# Patient Record
Sex: MALE | Race: BLACK OR AFRICAN AMERICAN | Employment: OTHER | URBAN - METROPOLITAN AREA
[De-identification: names, ages, dates, MRNs, and addresses within clinical notes are randomized per-mention and may not be internally consistent; named-entity substitution may affect disease eponyms.]

---

## 2019-08-23 ENCOUNTER — DOCUMENTATION ONLY (OUTPATIENT)
Dept: CARDIOLOGY CLINIC | Age: 64
End: 2019-08-23

## 2020-07-02 ENCOUNTER — OFFICE VISIT (OUTPATIENT)
Dept: CARDIOLOGY CLINIC | Age: 65
End: 2020-07-02

## 2020-07-02 VITALS
HEIGHT: 72 IN | TEMPERATURE: 98 F | HEART RATE: 71 BPM | SYSTOLIC BLOOD PRESSURE: 177 MMHG | RESPIRATION RATE: 18 BRPM | OXYGEN SATURATION: 99 % | BODY MASS INDEX: 27.71 KG/M2 | DIASTOLIC BLOOD PRESSURE: 84 MMHG | WEIGHT: 204.6 LBS

## 2020-07-02 DIAGNOSIS — R94.39 ABNORMAL NUCLEAR STRESS TEST: ICD-10-CM

## 2020-07-02 DIAGNOSIS — Z76.89 ESTABLISHING CARE WITH NEW DOCTOR, ENCOUNTER FOR: ICD-10-CM

## 2020-07-02 DIAGNOSIS — Z01.818 PRE-OP TESTING: Primary | ICD-10-CM

## 2020-07-02 RX ORDER — LISINOPRIL 40 MG/1
40 TABLET ORAL DAILY
COMMUNITY

## 2020-07-02 NOTE — PROGRESS NOTES
[facility]    Mr. Josafat Ang is a 81-year-old male with diabetes, hypertension    Patient is here today for for initial evaluation. He denies any prior history of myocardial infarction or congestive heart failure. He was referred to me for cardiac catheterization because of abnormal stress test before eye surgery. According to patient he supposed to have a cataract surgery. However patient had a nuclear stress test in 2018 which was abnormal.  Patient was told that he needs cardiac catheterization before undergoing cataract surgery  Patient is very frustrated that for cataract surgery he needs cardiac catheterization despite he is not having any symptoms. He denies any resting or exertional chest pain or shortness of breath. He said that he can walk up to 5 or 10 miles without any symptoms. He performs cardio exercise in the form of martial art without any symptoms. He can walk at a faster pace without any symptoms. He can climb 3 or 4 flight without any symptoms. He denies any presyncope or syncope. He denies PND or any edema. He denies any specific cardiac complaint at this time  Denies any nausea, vomiting, abdominal pain, fever, chills, sputum production. No hematuria or other bleeding complaints    Past Medical History:   Diagnosis Date    Diabetes ([facility] [address]     HTN (hypertension)     PAD (peripheral artery disease) (HCC)          No past surgical history on file. Current Outpatient Medications   Medication Sig    insulin detemir U-100 (LEVEMIR) 100 unit/mL injection by SubCUTAneous route nightly.  lisinopriL (PRINIVIL, ZESTRIL) 40 mg tablet Take 40 mg by mouth daily. No current facility-administered medications for this visit. Allergies and Sensitivities:  Allergies not on file    Family History:  No family history on file.     Social History:  Social History     Tobacco Use    Smoking status: Not on file Substance Use Topics    Alcohol use: Not on file    Drug use: Not on file     She  has no history on file for tobacco.  She  has no history on file for alcohol. Review of Systems:  Cardiac symptoms as noted above in HPI. All others negative. Denies fatigue, malaise, skin rash, joint pain, blurring vision, photophobia, neck pain, hemoptysis, chronic cough, nausea, vomiting, hematuria, burning micturition, BRBPR, chronic headaches. Physical Exam:  BP Readings from Last 3 Encounters:   07/02/20 177/84         Pulse Readings from Last 3 Encounters:   07/02/20 71          Wt Readings from Last 3 Encounters:   07/02/20 204 lb 9.6 oz (92.8 kg)       Constitutional: Oriented to person, place, and time. HENT: Head: Normocephalic and atraumatic. Eyes: Conjunctivae and extraocular motions are normal.   Neck: No JVD present. Carotid bruit is not appreciated. Cardiovascular: Regular rhythm. No murmur, gallop or rubs appreciated  Lung: Breath sounds normal. No respiratory distress. No ronchi or rales appreciated  Abdominal: No tenderness. No rebound and no guarding. Musculoskeletal: There is no lower extremity edema. No cynosis  Lymphadenopathy:  No cervical or supraclavicular adenopathy appriciated. Neurological: No gross motor deficit noted. Skin: No visible skin rash noted. No Ear discharge noted  Psychiatric: Normal mood and affect. Review of Data  LABS:   No results found for: NA, K, CL, CO2, GLU, BUN, CREA  No flowsheet data found. No results found for: ALT  No results found for: HBA1C, HGBE8, WPS1WXGB, WNQ1STOJ    EKG    ECHO    STRESS TEST (2018)  Inferior ischemia according to progress note from [place] [facility]. No detail available at this time    CATHETERIZATION    IMPRESSION & PLAN:  Mr. Sarah Beth Ceballos is 28-year-old male with diabetes and hypertension    Hypertension:  Initially his blood pressure was 177/84 mmHg however patient is very anxious and frustrated.   He tells me that usually his blood pressure is better. Repeat blood pressure was 140/82. Currently on lisinopril and I recommend to continue same  We will order echo to rule out hypertensive cardiovascular heart disease    Diabetes:  Currently patient appears to be on insulin. Goal hemoglobin A1c less than 7 is recommended from cardiovascular standpoint. Defer to PCP    Preoperative evaluation:  According to patient, he has tolerated 25 foot surgeries in the past.  Patient is supposed to undergo cataract surgery however he was told to have cardiac catheterization done prior to cataract surgery. He is frustrated and angry that he has to undergo cardiac catheterization for simple procedure like cataract especially as he has no exertional symptoms. According to patient and according to the record available, patient had complained of chest pain to [address] couple years ago and had a stress test and 2018 which showed possible ischemia inferiorly. Since then patient has no more chest pain and he has no shortness of breath. He is able to perform activity of daily living without any symptoms. His functional status appears to be more than 4 METS. I offered him cardiac catheterization as requested by [facility] however he is refusing any cardiac catheterization in absence of any symptoms which is reasonable  Cataract surgery usually is low risk surgery from cardiovascular standpoint. Patient is okay to consider echo and stress test prior to surgery. I would offer echo and stress test and if they are unremarkable, in my opinion it would be low risk to proceed cataract surgery. He is okay to get this testing done    This plan was discussed with patient who is in agreement. Thank you for allowing me to participate in patient care. Please feel free to call me if you have any question or concern. Belinda Chadwick MD  Please note: This document has been produced using voice recognition software.  Unrecognized errors in transcription may be present.

## 2020-07-02 NOTE — PROGRESS NOTES
Jonnie Goldberg presents today for   Chief Complaint   Patient presents with   [address] [place] [address] New Patient       Jonnie Goldberg preferred language for health care discussion is english/other. Is someone accompanying this pt? n    Is the patient using any DME equipment during OV? n    Depression Screening:  No flowsheet data found. Learning Assessment:  No flowsheet data found. Abuse Screening:  No flowsheet data found. Fall Risk  No flowsheet data found. Pt currently taking Anticoagulant therapy? n    Coordination of Care:  1. Have you been to the ER, urgent care clinic since your last visit? Hospitalized since your last visit? n    2. Have you seen or consulted any other health care providers outside of the [address] [place] [address] since your last visit? Include any pap smears or colon screening.  n

## 2020-07-02 NOTE — PATIENT INSTRUCTIONS
Testing   Echo/ Nuclear Stress  Please call [facility] central scheduling at [phone]  to schedule an appointment.    All testing is completed at [address] [facility]  **call office three days after testing for results**

## 2020-07-16 ENCOUNTER — HOSPITAL ENCOUNTER (OUTPATIENT)
Dept: NON INVASIVE DIAGNOSTICS | Age: 65
Discharge: HOME | End: 2020-07-16
Attending: INTERNAL MEDICINE
Payer: OTHER GOVERNMENT

## 2020-07-16 ENCOUNTER — HOSPITAL ENCOUNTER (OUTPATIENT)
Dept: NUCLEAR MEDICINE | Age: 65
Discharge: HOME | End: 2020-07-16
Attending: INTERNAL MEDICINE
Payer: OTHER GOVERNMENT

## 2020-07-16 VITALS
SYSTOLIC BLOOD PRESSURE: 192 MMHG | DIASTOLIC BLOOD PRESSURE: 89 MMHG | WEIGHT: 200 LBS | BODY MASS INDEX: 27.09 KG/M2 | HEIGHT: 72 IN

## 2020-07-16 VITALS
DIASTOLIC BLOOD PRESSURE: 84 MMHG | SYSTOLIC BLOOD PRESSURE: 177 MMHG | WEIGHT: 204 LBS | HEIGHT: 72 IN | BODY MASS INDEX: 27.63 KG/M2

## 2020-07-16 DIAGNOSIS — Z01.818 PRE-OP TESTING: ICD-10-CM

## 2020-07-16 DIAGNOSIS — R94.39 ABNORMAL NUCLEAR STRESS TEST: ICD-10-CM

## 2020-07-16 LAB
ECHO AO ASC DIAM: 3.29 CM
ECHO IVC PROX: 1.84 CM
ECHO IVC SNIFF: 1.84 CM
ECHO LA AREA 2C: 18.15 CM2
ECHO LA AREA 4C: 15.6 CM2
ECHO LA MAJOR AXIS: 3.21 CM
ECHO LA MINOR AXIS: 1.51 CM
ECHO LA VOL 2C: 57.49 ML (ref 22–52)
ECHO LA VOL 4C: 40.39 ML (ref 22–52)
ECHO LA VOL BP: 51.74 ML (ref 22–52)
ECHO LA VOL/BSA BIPLANE: 24.29 ML/M2 (ref 16–28)
ECHO LA VOLUME INDEX A2C: 26.99 ML/M2 (ref 16–28)
ECHO LA VOLUME INDEX A4C: 18.96 ML/M2 (ref 16–28)
ECHO LV E' LATERAL VELOCITY: 10.98 CM/S
ECHO LV E' SEPTAL VELOCITY: 6.51 CM/S
ECHO LV EDV A2C: 125.6 ML
ECHO LV EDV A4C: 126.5 ML
ECHO LV EDV BP: 127.4 ML (ref 56–104)
ECHO LV EDV INDEX A4C: 59.4 ML/M2
ECHO LV EDV INDEX BP: 59.8 ML/M2
ECHO LV EDV NDEX A2C: 59 ML/M2
ECHO LV EDV TEICHHOLZ: 0.5 ML
ECHO LV EJECTION FRACTION BIPLANE: 40.4 % (ref 55–100)
ECHO LV ESV A2C: 84.3 ML
ECHO LV ESV A4C: 68.6 ML
ECHO LV ESV BP: 75.9 ML (ref 19–49)
ECHO LV ESV INDEX A2C: 39.6 ML/M2
ECHO LV ESV INDEX A4C: 32.2 ML/M2
ECHO LV ESV INDEX BP: 35.6 ML/M2
ECHO LV ESV TEICHHOLZ: 0.29 ML
ECHO LV INTERNAL DIMENSION DIASTOLIC: 4.47 CM (ref 3.9–5.3)
ECHO LV INTERNAL DIMENSION SYSTOLIC: 3.54 CM
ECHO LV IVSD: 1.29 CM (ref 0.6–0.9)
ECHO LV MASS 2D: 196.1 G (ref 88–224)
ECHO LV MASS INDEX 2D: 92 G/M2 (ref 49–115)
ECHO LV POSTERIOR WALL DIASTOLIC: 1.11 CM (ref 0.6–0.9)
ECHO LVOT DIAM: 2.03 CM
ECHO LVOT PEAK GRADIENT: 6.2 MMHG
ECHO LVOT SV: 74.2 ML
ECHO LVOT VTI: 22.9 CM
ECHO MV A VELOCITY: 56.09 CM/S
ECHO MV E DECELERATION TIME (DT): 297.7 MS
ECHO MV E VELOCITY: 47.17 CM/S
ECHO MV E/A RATIO: 0.84
ECHO MV E/E' LATERAL: 4.3
ECHO MV E/E' RATIO (AVERAGED): 5.77
ECHO MV E/E' SEPTAL: 7.25
ECHO RA MINOR AXIS: 3.76 CM
ECHO RV TAPSE: 2.21 CM (ref 1.5–2)
LVFS 2D: 20.73 %
LVOT MG: 2.37 MMHG
LVOT MV: 0.68 CM/S
LVSV (MOD BI): 23.73 ML
LVSV (MOD SINGLE 4C): 26.68 ML
LVSV (MOD SINGLE): 19.03 ML
LVSV (TEICH): 17.77 ML
MV DEC SLOPE: 1.58

## 2020-07-16 PROCEDURE — A9500 TC99M SESTAMIBI: HCPCS

## 2020-07-16 PROCEDURE — 93306 TTE W/DOPPLER COMPLETE: CPT

## 2020-07-16 PROCEDURE — 93017 CV STRESS TEST TRACING ONLY: CPT

## 2020-07-16 PROCEDURE — 74011250636 HC RX REV CODE- 250/636: Performed by: INTERNAL MEDICINE

## 2020-07-16 RX ADMIN — REGADENOSON 0.4 MG: 0.08 INJECTION, SOLUTION INTRAVENOUS at 11:20

## 2020-07-20 LAB
STRESS BASELINE HR: 70 BPM
STRESS ESTIMATED WORKLOAD: 2.9 METS
STRESS EXERCISE DUR MIN: NORMAL
STRESS PEAK DIAS BP: 88 MMHG
STRESS PEAK SYS BP: 205 MMHG
STRESS PERCENT HR ACHIEVED: 81 %
STRESS POST PEAK HR: 127 BPM
STRESS RATE PRESSURE PRODUCT: NORMAL BPM*MMHG
STRESS TARGET HR: 157 BPM

## 2020-08-07 ENCOUNTER — TELEPHONE (OUTPATIENT)
Dept: CARDIOLOGY CLINIC | Age: 65
End: 2020-08-07

## 2020-08-07 NOTE — TELEPHONE ENCOUNTER
Incoming form pt. Two patient Identifiers confirmed. Advised pt Dr Ariadne Duval was not in office today but was back in office Wednesday. Advised I would consult them and advise on echo and nuc. Pt verbalized understanding.

## 2020-08-07 NOTE — LETTER
8/12/2020 Ms. Anil Tamez [address] [id number] To whom it may concern: Anil Tamez was seen in our office on July 2, 2020 for cardiac evaluation. From a cardiac standpoint he is low risk for cataract surgery. Please feel free to contact our office if you have any questions regarding this patient. Sincerely, Shelby Benson MD 
 
 Attempted to call [facility] # -- number is not in service.

## 2020-08-12 NOTE — TELEPHONE ENCOUNTER
Contacted pt at [facility] number. Two patient Identifiers confirmed. Advised pt per Dr Aaron Zaragoza notes. Pt stated he needs a clearance for cataract surgery. Advised letter will be ready for p/u tomorrow. Pt verbalized understanding.

## 2020-09-04 ENCOUNTER — OFFICE VISIT (OUTPATIENT)
Dept: CARDIOLOGY CLINIC | Age: 65
End: 2020-09-04

## 2020-09-04 VITALS
HEART RATE: 67 BPM | DIASTOLIC BLOOD PRESSURE: 70 MMHG | SYSTOLIC BLOOD PRESSURE: 132 MMHG | BODY MASS INDEX: 28.07 KG/M2 | OXYGEN SATURATION: 97 % | TEMPERATURE: 98.2 F | WEIGHT: 207 LBS

## 2020-09-04 DIAGNOSIS — Z01.818 PRE-OP TESTING: Primary | ICD-10-CM

## 2020-09-04 RX ORDER — INSULIN GLARGINE 100 [IU]/ML
INJECTION, SOLUTION SUBCUTANEOUS
COMMUNITY

## 2020-09-04 RX ORDER — GLIPIZIDE 10 MG/1
10 TABLET ORAL 2 TIMES DAILY
COMMUNITY

## 2020-09-04 RX ORDER — AMLODIPINE BESYLATE 10 MG/1
TABLET ORAL DAILY
COMMUNITY

## 2020-09-04 NOTE — PROGRESS NOTES
Ivan rAi presents today for   Chief Complaint   Patient presents with   [address] preferred language for health care discussion is english/other. Is someone accompanying this pt? no    Is the patient using any DME equipment during [address]? No     Depression Screening:  3 most recent PHQ Screens 9/4/2020   Little interest or pleasure in doing things Not at all   Feeling down, depressed, irritable, or hopeless Not at all   Total Score PHQ 2 0       Learning Assessment:  No Data    Abuse Screening:  completed    Fall Risk  completed    Pt currently taking Anticoagulant therapy? no    Coordination of Care:  1. Have you been to the ER, urgent care clinic since your last visit? Hospitalized since your last visit? no    2. Have you seen or consulted any other health care providers outside of the [address] [place] [address] since your last visit? Include any pap smears or colon screening.  yes

## 2020-09-04 NOTE — PROGRESS NOTES
[facility]    Mr. Governor Wells is a 62 y.o. male with diabetes, hypertension    Patient is here today for follow-up appointment. He denies any symptoms since last visit. He denies any palpitation, presyncope or syncope. He denies any resting or exertional chest pain. He has no cardiac symptoms to report at this time. Please see HPI from last clinic visit as below  Patient is here today for for initial evaluation. He denies any prior history of myocardial infarction or congestive heart failure. He was referred to me for cardiac catheterization because of abnormal stress test before eye surgery. According to patient he supposed to have a cataract surgery. However patient had a nuclear stress test in 2018 which was abnormal.  Patient was told that he needs cardiac catheterization before undergoing cataract surgery  Patient is very frustrated that for cataract surgery he needs cardiac catheterization despite he is not having any symptoms. He denies any resting or exertional chest pain or shortness of breath. He said that he can walk up to 5 or 10 miles without any symptoms. He performs cardio exercise in the form of martial art without any symptoms. He can walk at a faster pace without any symptoms. He can climb 3 or 4 flight without any symptoms. He denies any presyncope or syncope. He denies PND or any edema. He denies any specific cardiac complaint at this time  Denies any nausea, vomiting, abdominal pain, fever, chills, sputum production. No hematuria or other bleeding complaints    Past Medical History:   Diagnosis Date    Diabetes ([facility] [address]     HTN (hypertension)     PAD (peripheral artery disease) (HCC)          No past surgical history on file. Current Outpatient Medications   Medication Sig    amLODIPine (NORVASC) 10 mg tablet Take  by mouth daily.     glipiZIDE (GLUCOTROL) 10 mg tablet Take 10 mg by mouth two (2) times a day.  insulin glargine (Lantus Solostar U-100 Insulin) 100 unit/mL (3 mL) inpn by SubCUTAneous route. 30-35 units per day per pt    insulin detemir U-100 (LEVEMIR) 100 unit/mL injection by SubCUTAneous route nightly.  lisinopriL (PRINIVIL, ZESTRIL) 40 mg tablet Take 40 mg by mouth daily. No current facility-administered medications for this visit. Allergies and Sensitivities:  No Known Allergies    Family History:  No family history on file. Social History:  Social History     Tobacco Use    Smoking status: Never Smoker    Smokeless tobacco: Never Used   Substance Use Topics    Alcohol use: Not on file    Drug use: Not on file     He  reports that he has never smoked. He has never used smokeless tobacco.  He  has no history on file for alcohol. Review of Systems:  Cardiac symptoms as noted above in HPI. All others negative. Denies fatigue, malaise, skin rash, joint pain, blurring vision, photophobia, neck pain, hemoptysis, chronic cough, nausea, vomiting, hematuria, burning micturition, BRBPR, chronic headaches. Physical Exam:  BP Readings from Last 3 Encounters:   09/04/20 132/70   07/16/20 177/84   07/16/20 192/89         Pulse Readings from Last 3 Encounters:   09/04/20 67   07/02/20 71          Wt Readings from Last 3 Encounters:   09/04/20 207 lb (93.9 kg)   07/16/20 204 lb (92.5 kg)   07/16/20 200 lb (90.7 kg)       Constitutional: Oriented to person, place, and time. HENT: Head: Normocephalic and atraumatic. Neck: No JVD present. Carotid bruit is not appreciated. Cardiovascular: Regular rhythm. No murmur, gallop or rubs appreciated  Lung: Breath sounds normal. No respiratory distress. No ronchi or rales appreciated  Abdominal: No tenderness. No rebound and no guarding. Musculoskeletal: There is no lower extremity edema. No cynosis    Review of Data  LABS:   No results found for: NA, K, CL, CO2, GLU, BUN, CREA  No flowsheet data found.   No results found for: ALT  No results found for: HBA1C, HGBE8, AJI4DYXZ, NRL2PGII, VIG8CMRW    EKG    ECHO (08/20)  Left Ventricle  Normal cavity size, systolic function (ejection fraction normal) and diastolic function. Mild septal wall hypertrophy. False tendon is noted. The estimated ejection fraction is 50 - 55%. Visually measured ejection fraction. LV wall motion is noted to be no regional wall motion abnormality. Wall Scoring  The left ventricular wall motion is normal.             Left Atrium  Normal cavity size. Left Atrium volume index is 24.06 mL/m2. Right Ventricle  Normal cavity size and global systolic function. Right Atrium  Normal cavity size. Aortic Valve  Trileaflet valve structure, no stenosis and no regurgitation. Mitral Valve  No stenosis and no regurgitation. Mitral valve non-specific thickening. Tricuspid Valve  Normal valve structure and no stenosis. Tricuspid regurgitation is inadequate for estimation of right ventricular systolic pressure. Pulmonic Valve  Normal valve structure, no stenosis and no regurgitation. STRESS TEST (2018)  Inferior ischemia according to progress note from [place] [facility]. No detail available at this time    STRESS (07/20)  · Baseline ECG: Sinus rhythm. · Gated SPECT: Left ventricular function post-stress was normal. Calculated ejection fraction is 60%. · Left ventricular perfusion is equivocal.  · Myocardial perfusion imaging defect 1: There is a defect that is small in size present in the inferior location(s) that is non-reversible. The possibility of artifact and infarction cannot be excluded. · There was no convincing evidence of significant reversible defect to suggest on going major ischemia. Perfusion defect     IMPRESSION & PLAN:  Mr. Tommie Masterson is 62 y.o. male with diabetes and hypertension    Hypertension:  Blood pressure is normal.  Continue current medications    Diabetes:  Currently patient appears to be on insulin.   Goal hemoglobin A1c less than 7 is recommended from cardiovascular standpoint. Defer to PCP    Preoperative evaluation:  According to patient, he has tolerated 25 foot surgeries in the past.  Patient is supposed to undergo cataract surgery however he was told to have cardiac catheterization done prior to cataract surgery. He was frustrated and angry that he has to undergo cardiac catheterization for simple procedure like cataract especially as he has no exertional symptoms. According to patient and according to the record available, patient had complained of chest pain to [address] couple years ago and had a stress test and 2018 which showed possible ischemia inferiorly. Since then patient has no more chest pain and he has no shortness of breath. He is able to perform activity of daily living without any symptoms. His functional status appears to be more than 4 METS. Nuclear stress test was performed again which showed inferior defect which could be artifact. There was no significant reversible defect. I do not believe he needs any further cardiac work-up for cataract surgery in absence of any cardiac symptoms. Iin my opinion it would be low risk to proceed cataract surgery. This plan was discussed with patient who is in agreement. Thank you for allowing me to participate in patient care. Please feel free to call me if you have any question or concern. Danisha Thao MD  Please note: This document has been produced using voice recognition software. Unrecognized errors in transcription may be present.